# Patient Record
Sex: MALE | Race: WHITE | NOT HISPANIC OR LATINO | Employment: OTHER | URBAN - METROPOLITAN AREA
[De-identification: names, ages, dates, MRNs, and addresses within clinical notes are randomized per-mention and may not be internally consistent; named-entity substitution may affect disease eponyms.]

---

## 2021-04-14 DIAGNOSIS — Z23 ENCOUNTER FOR IMMUNIZATION: ICD-10-CM

## 2022-11-10 ENCOUNTER — EVALUATION (OUTPATIENT)
Dept: PHYSICAL THERAPY | Facility: CLINIC | Age: 67
End: 2022-11-10

## 2022-11-10 DIAGNOSIS — M54.32 SCIATICA OF LEFT SIDE: Primary | ICD-10-CM

## 2022-11-10 NOTE — LETTER
2022    [facility] [address]    Patient: Xin Collado   YOB: 1955   Date of Visit: 11/10/2022     Encounter Diagnosis     ICD-10-CM    1  Sciatica of left side  M54 32        Dear Dr Valentin Cavanaugh: Thank you for your recent referral of Xin Collado  Please review the attached evaluation summary from [facility] recent visit  Please verify that you agree with the plan of care by signing the attached order  If you have any questions or concerns, please do not hesitate to call  I sincerely appreciate the opportunity to share in the care of one of your patients and hope to have another opportunity to work with you in the near future  Sincerely,    Adam Early, PT      Referring Provider:      I certify that I have read the below Plan of Care and certify the need for these services furnished under this plan of treatment while under my care                      La Nena Hamilton DO  [address] [facility] [address]  Via Fax: [phone]          PT Evaluation     Today's date: 11/10/2022  Patient name: Xin Collado  : 1955  MRN: [id number]  Referring provider: Se Vásquez DO  Dx:   Encounter Diagnosis     ICD-10-CM    1  Sciatica of left side  M54 32        Start Time: 2978  Stop Time: 171  Total time in clinic (min): 41 minutes    Assessment  Assessment details: Xin Collado is a 69 y o  male presenting to outpatient physical therapy at [facility] with complaints of Low back and LLE pain   He presents with decreased lumbopelvic range of motion, decreased strength, limited flexibility, poor postural awareness, poor body mechanics, altered gait pattern, poor balance, decreased tolerance to activity and decreased functional mobility due to sciatica   He would benefit from skilled PT services in order to address these deficits and reach maximum level of function   Thank you for the referral!    Impairments: abnormal or restricted ROM, activity intolerance, impaired physical strength and pain with function    Symptom irritability: lowUnderstanding of Dx/Px/POC: good   Prognosis: good    Goals  STG  1  Independent with HEP in 2 weeks  2  Decrease pain at worst by 50% in 2 weeks    LTG  1  Increase lumbopelvic strength in all planes to 5/5 in 5 weeks  2  Return to full, unrestricted sitting, standing and walking in 5 weeks        Plan  Patient would benefit from: skilled physical therapy  Planned modality interventions: thermotherapy: hydrocollator packs  Planned therapy interventions: manual therapy, neuromuscular re-education, therapeutic activities and therapeutic exercise  Frequency: 2x week  Duration in weeks: 4  Treatment plan discussed with: patient        Subjective Evaluation    History of Present Illness  Date of onset: 10/27/2022  Mechanism of injury: Pt reports 2 weeks of L side back and sciatic sxs which have worsened when driving  X rays normal  Pt denies pain with coughing sneezing  No pain at night  Pain is mainly affected by position     Quality of life: good    Pain  Current pain ratin  At best pain ratin  At worst pain ratin  Quality: dull ache and sharp  Relieving factors: ice and medications  Aggravating factors: sitting, standing and walking  Progression: no change      Diagnostic Tests  X-ray: normal  Treatments  Previous treatment: chiropractic, injection treatment and medication  Patient Goals  Patient goals for therapy: increased strength, independence with ADLs/IADLs and decreased pain  Patient goal: Eliminate pain with driving        Objective     Active Range of Motion     Lumbar   Flexion:  with pain Restriction level: minimal  Extension:  with pain Restriction level: maximal  Left lateral flexion:  with pain Restriction level: minimal  Right lateral flexion:  Restriction level: minimal  Left rotation:  WFL  Right rotation:  [facility]    Joint Play     Hypomobile: L1, L2, L3, L4 and L5 Pain: L4 and L5     Strength/Myotome Testing     Lumbar   Left   Heel walk: normal  Toe walk: normal    Right   Heel walk: normal  Toe walk: normal    Left Hip   Planes of Motion   Flexion: 5  Extension: 5  Abduction: 5  Adduction: 5    Right Hip   Planes of Motion   Abduction: 5  Adduction: 5    Left Knee   Flexion: 5  Extension: 5    Right Knee   Flexion: 5  Extension: 5    Left Ankle/Foot   Dorsiflexion: 5  Plantar flexion: 5    Right Ankle/Foot   Dorsiflexion: 5  Plantar flexion: 5    Muscle Activation   Patient able to activate left transverse abdominals and right transverse abdominals  Tests     Lumbar     Left   Positive passive SLR and slump test      Right   Negative passive SLR  Left Pelvic Girdle/Sacrum   Positive: active SLR test      Right Pelvic Girdle/Sacrum   Negative: active SLR test      Left Hip   Negative DOLORES       Right Hip   Negative DOLORES          EPOC: 12/14/22           Manuals 11/10            MITZY ritter Lumbar spine NS                                                   Neuro Re-Ed             Prone glut set 10x5"            Prone hip ext iso 10x5" ea                                                                             Ther Ex             (BRANDAN) Prone on Elbows with heat on LS 8'                                                                                                       Ther Activity                                       Gait Training                                       Modalities

## 2022-11-10 NOTE — PROGRESS NOTES
PT Evaluation     Today's date: 11/10/2022  Patient name: Dori Joyner  : 1955  MRN: [id number]  Referring provider: Lisa Diaz DO  Dx:   Encounter Diagnosis     ICD-10-CM    1  Sciatica of left side  M54 32        Start Time: 163  Stop Time:   Total time in clinic (min): 41 minutes    Assessment  Assessment details: Dori Joyner is a 69 y o  male presenting to outpatient physical therapy at [facility] with complaints of Low back and LLE pain   He presents with decreased lumbopelvic range of motion, decreased strength, limited flexibility, poor postural awareness, poor body mechanics, altered gait pattern, poor balance, decreased tolerance to activity and decreased functional mobility due to sciatica   He would benefit from skilled PT services in order to address these deficits and reach maximum level of function   Thank you for the referral!    Impairments: abnormal or restricted ROM, activity intolerance, impaired physical strength and pain with function    Symptom irritability: lowUnderstanding of Dx/Px/POC: good   Prognosis: good    Goals  STG  1  Independent with HEP in 2 weeks  2  Decrease pain at worst by 50% in 2 weeks    LTG  1  Increase lumbopelvic strength in all planes to 5/5 in 5 weeks  2  Return to full, unrestricted sitting, standing and walking in 5 weeks        Plan  Patient would benefit from: skilled physical therapy  Planned modality interventions: thermotherapy: hydrocollator packs  Planned therapy interventions: manual therapy, neuromuscular re-education, therapeutic activities and therapeutic exercise  Frequency: 2x week  Duration in weeks: 4  Treatment plan discussed with: patient        Subjective Evaluation    History of Present Illness  Date of onset: 10/27/2022  Mechanism of injury: Pt reports 2 weeks of L side back and sciatic sxs which have worsened when driving  X rays normal  Pt denies pain with coughing sneezing  No pain at night   Pain is mainly affected by position  Quality of life: good    Pain  Current pain ratin  At best pain ratin  At worst pain ratin  Quality: dull ache and sharp  Relieving factors: ice and medications  Aggravating factors: sitting, standing and walking  Progression: no change      Diagnostic Tests  X-ray: normal  Treatments  Previous treatment: chiropractic, injection treatment and medication  Patient Goals  Patient goals for therapy: increased strength, independence with ADLs/IADLs and decreased pain  Patient goal: Eliminate pain with driving        Objective     Active Range of Motion     Lumbar   Flexion:  with pain Restriction level: minimal  Extension:  with pain Restriction level: maximal  Left lateral flexion:  with pain Restriction level: minimal  Right lateral flexion:  Restriction level: minimal  Left rotation:  WFL  Right rotation:  [facility]    Joint Play     Hypomobile: L1, L2, L3, L4 and L5     Pain: L4 and L5     Strength/Myotome Testing     Lumbar   Left   Heel walk: normal  Toe walk: normal    Right   Heel walk: normal  Toe walk: normal    Left Hip   Planes of Motion   Flexion: 5  Extension: 5  Abduction: 5  Adduction: 5    Right Hip   Planes of Motion   Abduction: 5  Adduction: 5    Left Knee   Flexion: 5  Extension: 5    Right Knee   Flexion: 5  Extension: 5    Left Ankle/Foot   Dorsiflexion: 5  Plantar flexion: 5    Right Ankle/Foot   Dorsiflexion: 5  Plantar flexion: 5    Muscle Activation   Patient able to activate left transverse abdominals and right transverse abdominals  Tests     Lumbar     Left   Positive passive SLR and slump test      Right   Negative passive SLR  Left Pelvic Girdle/Sacrum   Positive: active SLR test      Right Pelvic Girdle/Sacrum   Negative: active SLR test      Left Hip   Negative DOLORES       Right Hip   Negative DOLORES          EPOC: 22           Manuals 11/10            MITZY ritter Lumbar spine NS                                                   Neuro Re-Ed             Prone glut set 10x5"            Prone hip ext iso 10x5" ea                                                                             Ther Ex             (BRANDAN) Prone on Elbows with heat on LS 8'                                                                                                       Ther Activity                                       Gait Training                                       Modalities

## 2022-11-14 ENCOUNTER — OFFICE VISIT (OUTPATIENT)
Dept: PHYSICAL THERAPY | Facility: CLINIC | Age: 67
End: 2022-11-14

## 2022-11-14 DIAGNOSIS — M54.32 SCIATICA OF LEFT SIDE: Primary | ICD-10-CM

## 2022-11-14 NOTE — PROGRESS NOTES
Daily Note     Today's date: 2022  Patient name: Kamila Salazar  : 1955  MRN: [id number]  Referring provider: Aracelis Adan DO  Dx:   Encounter Diagnosis     ICD-10-CM    1  Sciatica of left side  M54 32                   Subjective: Notes feeling okay  Still getting discomfort with sitting  Objective: See treatment diary below      Assessment: Continues to have L leg length dyscrepancy with noted pelvic upslip  Improved with Gr 5 L leg distraction and reinforced with performance of L SL bridge  Weakness with hip ABD and extension  Plan: Continue per plan of care              Manuals 11/10 11/14           MITZY ritter Lumbar spine NS PF                                                  Neuro Re-Ed             Prone glut set 10x5" 10x5"           Prone hip ext iso 10x5" ea 10x5"                                                                            Ther Ex             (BRANDAN) Prone on Elbows with heat on LS 8' 8'           SL Bridge  2x10           Hip ABD  2x10 ea           Sit to stand  2x10                                                               Ther Activity                                       Gait Training                                       Modalities

## 2022-11-16 ENCOUNTER — OFFICE VISIT (OUTPATIENT)
Dept: PHYSICAL THERAPY | Facility: CLINIC | Age: 67
End: 2022-11-16

## 2022-11-16 DIAGNOSIS — M54.32 SCIATICA OF LEFT SIDE: Primary | ICD-10-CM

## 2022-11-16 NOTE — PROGRESS NOTES
Daily Note     Today's date: 2022  Patient name: Vinita Rubio  : 1955  MRN: [id number]  Referring provider: Johanna Walker DO  Dx:   Encounter Diagnosis     ICD-10-CM    1  Sciatica of left side  M54 32           Subjective: Compliant with HEP, no questions regarding POC, motivated to continue PT      Objective: See treatment diary below  Added sit-stand with no UE and bridge with Tband to HEP, see handout  Assessment: Tolerated treatment well  with progression of treatment program as noted below requiring verbal and tactile cues from PT for safe execution of therapeutic exercise, specifically with sidelying hip ABD to avoid flexion compensation    Patient demonstrated fatigue post treatment and would benefit from continued PT      Plan: Progress treatment as tolerated  Assess HEP technique & effects of implementation             Manuals 11/10 11/14 11/16          PA glides Lumbar spine NS PF NS                                                 Neuro Re-Ed             Prone glut set 10x5" 10x5" 20x5"          Prone hip ext iso 10x5" ea 10x5" 20x5"          Sciatic nerve glide             Prone Press             Kit Stretch                                       Ther Ex             (BRANDAN) Prone on Elbows with heat on LS 8' 8' 5' w/prone glut sets          SL Bridge  2x10 3x10 Red Tband around knees          Hip ABD  2x10 ea 3x10          Sit to stand  2x10 3x10 7 5lb KB          Seated HS stretch   10x10" ea                                                 Ther Activity                                       Gait Training                                       Modalities

## 2022-11-21 ENCOUNTER — OFFICE VISIT (OUTPATIENT)
Dept: PHYSICAL THERAPY | Facility: CLINIC | Age: 67
End: 2022-11-21

## 2022-11-21 DIAGNOSIS — M54.32 SCIATICA OF LEFT SIDE: Primary | ICD-10-CM

## 2022-11-21 NOTE — PROGRESS NOTES
Daily Note     Today's date: 2022  Patient name: Sunny Celestin  : 1955  MRN: [id number]  Referring provider: Dakotah Weiss DO  Dx:   Encounter Diagnosis     ICD-10-CM    1  Sciatica of left side  M54 32           Subjective: Compliant with HEP, no questions regarding POC, motivated to continue PT      Objective: See treatment diary below  Added seated hamstring stretches to HEP, see handout  Assessment: Tolerated treatment well  with progression of treatment program as noted below requiring verbal and tactile cues from PT for safe execution of therapeutic exercise  Patient demonstrated fatigue post treatment and would benefit from continued PT      Plan: Progress treatment as tolerated  Assess HEP technique & effects of implementation              Manuals           PA glides Lumbar spine NS          G5 Leg pull                                 Neuro Re-Ed           Prone glut set 20x5"          Prone hip ext iso 20x5"          Sciatic nerve glide 10x10" ea                                                      Ther Ex           (BRANDAN) Prone on Elbows with heat on LS 5' w/prone glut sets          SL Bridge 2x10 ea          Hip ABD 2x10 3" supine clam Purple Tb          Sit to stand 2x10 10lb KB          Seated HS stretch 10x10" ea          Crossed trunk rotation 10x10 ea          Seated ball rollout x10 5" ea x3 directions                     Ther Activity                                 Gait Training                                 Modalities

## 2022-11-23 ENCOUNTER — OFFICE VISIT (OUTPATIENT)
Dept: PHYSICAL THERAPY | Facility: CLINIC | Age: 67
End: 2022-11-23

## 2022-11-23 DIAGNOSIS — M54.32 SCIATICA OF LEFT SIDE: Primary | ICD-10-CM

## 2022-11-23 NOTE — PROGRESS NOTES
Daily Note     Today's date: 2022  Patient name: Darlin Saint  : 1955  MRN: [id number]  Referring provider: Jeanna Lino DO  Dx:   Encounter Diagnosis     ICD-10-CM    1  Sciatica of left side  M54 32                      Subjective: Notes feeling the same pain at L HS with sitting for prolonged periods  Slight reduced sxs with towel roll placement with driving  Objective: See treatment diary below      Assessment: TTP to L biceps femoris improved with TrP massage and performance of bridge walkouts and sciatic nerve glides  Still pain with L gluteal deep pressure improved post STM and MFR  Reviewed sitting posture in car- moved seated from reclined position to more 90/90/90 position and towel roll placement  Plan: Continue per plan of care              Manuals          PA glides Lumbar spine NS PF         G5 Leg pull  PF                               Neuro Re-Ed           Prone glut set 20x5" 20x5         Prone hip ext iso 20x5" 20x5         Sciatic nerve glide 10x10" ea 10x2                                                     Ther Ex           (BRANDAN) Prone on Elbows with heat on LS 5' w/prone glut sets 5 min          SL Bridge 2x10 ea NV         Hip ABD 2x10 3" supine clam Purple Tb NV         Sit to stand 2x10 10lb KB          Seated HS stretch 10x10" ea 10x3"         Crossed trunk rotation 10x10 ea          Seated ball rollout x10 5" ea x3 directions          Bridge walkouts  20x         Ther Activity                                 Gait Training                                 Modalities

## 2022-11-28 ENCOUNTER — OFFICE VISIT (OUTPATIENT)
Dept: PHYSICAL THERAPY | Facility: CLINIC | Age: 67
End: 2022-11-28

## 2022-11-28 DIAGNOSIS — M54.32 SCIATICA OF LEFT SIDE: Primary | ICD-10-CM

## 2022-11-28 NOTE — PROGRESS NOTES
Daily Note     Today's date: 2022  Patient name: Dania Norman  : 1955  MRN: [id number]  Referring provider: Olga Gee DO  Dx:   Encounter Diagnosis     ICD-10-CM    1  Sciatica of left side  M54 32             Subjective: significant flare up of LLE sxs, worse after last session  Unable to go hunting or transfer from car      Objective: See treatment diary below      Assessment: Tolerated treatment well reporting improved sxs from baseline following MT addition of ART to HS mm  Pt educated in seated HS stretching with hip ER to isolate mm tightness in LLE  Patient would benefit from continued PT      Plan: Progress treatment as tolerated  Perform RE NV  Manuals         PA glides Lumbar spine NS PF NS        G5 Leg pull  PF         L HS ART    NS        LS HS stretch   NS        Neuro Re-Ed           Prone glut set 20x5" 20x5 20x5        Prone hip ext iso 20x5" 20x5         Sciatic nerve glide 10x10" ea 10x2                                                     Ther Ex           (BRANDAN) Prone on Elbows with heat on LS 5' w/prone glut sets 5 min  5'        SL Bridge 2x10 ea NV         Slant board   10x10"        Seated HS stretch with L Hip ER   10x10"        Hip ABD 2x10 3" supine clam Purple Tb NV         Sit to stand 2x10 10lb KB          Seated HS stretch 10x10" ea 10x3"         Crossed trunk rotation 10x10 ea          Seated ball rollout x10 5" ea x3 directions          Bridge walkouts  20x         Ther Activity           TM incline   5 min L8 incline 1  7mph                   Gait Training                                 Modalities

## 2022-11-30 ENCOUNTER — EVALUATION (OUTPATIENT)
Dept: PHYSICAL THERAPY | Facility: CLINIC | Age: 67
End: 2022-11-30

## 2022-11-30 DIAGNOSIS — M54.32 SCIATICA OF LEFT SIDE: Primary | ICD-10-CM

## 2022-11-30 NOTE — PROGRESS NOTES
PT Re-Evaluation     Today's date: 2022  Patient name: Xin Collado  : 1955  MRN: [id number]  Referring provider: Se Vásquez DO  Dx:   Encounter Diagnosis     ICD-10-CM    1  Sciatica of left side  M54 32        Assessment  Assessment details: Xin Collado is a 69 y o  male seen in outpatient physical therapy at [facility] with complaints of Low back and LLE pain   He has fredy treated for decreased lumbopelvic range of motion, decreased strength, limited flexibility, poor postural awareness, poor body mechanics, altered gait pattern, poor balance, decreased tolerance to activity and decreased functional mobility due to sciatica   Re-evaluation was performed today to assess if he would benefit from skilled PT services in order to address these deficits and reach maximum level of function   Thank you for the referral!    Impairments: abnormal or restricted ROM, activity intolerance, impaired physical strength and pain with function    Symptom irritability: lowUnderstanding of Dx/Px/POC: good   Prognosis: good    Goals  STG  1  Independent with HEP in 2 weeks       Goal met  2  Decrease pain at worst by 50% in 2 weeks     No progress    LTG  1  Increase lumbopelvic strength in all planes to 5/5 in 5 weeks   Goal met  2  Return to full, unrestricted sitting, standing and walking in 5 weeks  Limited sitting for driving, somewhat with standing/walking        Plan  Patient has a f/u with physician next Monday to discuss next step / further treatment options to provide symptoms relief         Subjective Evaluation    History of Present Illness  Date of onset: 10/27/2022  Mechanism of injury: Pt reports 2 weeks of L side back and sciatic sxs which have worsened when driving  X rays normal  Pt denies pain with coughing sneezing  No pain at night  Pain is mainly affected by position      Some pain at night reported now  Quality of life: good    Pain  Current pain ratin  At best pain ratin  At worst pain ratin       9/10  Quality: dull ache and sharp  Relieving factors: ice and medications  Aggravating factors: sitting, standing and walking  Progression: no change      Diagnostic Tests  X-ray: normal  Treatments  Previous treatment: chiropractic, medication  Patient Goals  Patient goals for therapy: increased strength, independence with ADLs/IADLs and decreased pain  Patient goal: Eliminate pain with driving    No change        Objective     Active Range of Motion     Lumbar   Flexion:  with pain Restriction level: minimal     No pain, LLE tightness reported  Extension:  with pain Restriction level: maximal    Pain R side with minimal restriction   Left lateral flexion:  with pain Restriction level: minimal   R side pain with no restriction   Right lateral flexion:  Restriction level: minimal    R side pain with no restriction  Left rotation:  WFL  Right rotation:  [facility]    Joint Play     Hypomobile: L1, L2, L3, L4 and L5     Pain: L4 and L5     Strength/Myotome Testing     Lumbar   Left   Heel walk: normal  Toe walk: normal    Right   Heel walk: normal  Toe walk: normal    Left Hip   Planes of Motion   Flexion: 5  Extension: 5  Abduction: 5  Adduction: 5    Right Hip   Planes of Motion   Abduction: 5  Adduction: 5    Left Knee   Flexion: 5  Extension: 5    Right Knee   Flexion: 5  Extension: 5    Left Ankle/Foot   Dorsiflexion: 5  Plantar flexion: 5    Right Ankle/Foot   Dorsiflexion: 5  Plantar flexion: 5    Muscle Activation   Patient able to activate left transverse abdominals and right transverse abdominals  Tests     Lumbar     Left   Positive passive SLR and slump test      negative    Right   Negative passive SLR  Left Pelvic Girdle/Sacrum   Positive: active SLR test        negative    Right Pelvic Girdle/Sacrum   Negative: active SLR test      Left Hip   Negative DOLORES       Right Hip   Negative DOLORES          EPOC: 22         Manuals        PA riya Lumbar spine NS PF NS        G5 Leg pull  PF         L HS ART    NS        LS HS stretch   NS NS       L piriformis stretch    NS       Neuro Re-Ed           Prone glut set 20x5" 20x5 20x5        Prone hip ext iso 20x5" 20x5  20x5"       Sciatic nerve glide 10x10" ea 10x2                                                     Ther Ex           (BRANDAN) Prone on Elbows with heat on LS 5' w/prone glut sets 5 min  5'        SL Bridge 2x10 ea NV         Slant board   10x10"        Seated HS stretch with L Hip ER   10x10" 10x10"       Hip ABD 2x10 3" supine clam Purple Tb NV         Sit to stand 2x10 10lb KB          Seated HS stretch 10x10" ea 10x3"         Crossed trunk rotation 10x10 ea          Seated ball rollout x10 5" ea x3 directions          Bridge walkouts  20x         Ther Activity           TM incline   5 min L8 incline 1  7mph                   Gait Training                                 Modalities

## 2022-11-30 NOTE — LETTER
2022    [facility] [address]    Patient: Landon Akbar   YOB: 1955   Date of Visit: 2022     Encounter Diagnosis     ICD-10-CM    1  Sciatica of left side  M54 32           Dear Dr Marisa Najera: Thank you for your recent referral of Landon Akbar  Please review the attached evaluation summary from [facility] recent visit  Please verify that you agree with the plan of care by signing the attached order  If you have any questions or concerns, please do not hesitate to call  I sincerely appreciate the opportunity to share in the care of one of your patients and hope to have another opportunity to work with you in the near future  Sincerely,    Demetrius Posadas, PT      Referring Provider:      I certify that I have read the below Plan of Care and certify the need for these services furnished under this plan of treatment while under my care                      Tushar Salcedo DO  [address] [facility] [address]  Via Fax: [phone]          PT Re-Evaluation     Today's date: 2022  Patient name: Landon Akbar  : 1955  MRN: [id number]  Referring provider: Huong Culver DO  Dx:   Encounter Diagnosis     ICD-10-CM    1  Sciatica of left side  M54 32        Assessment  Assessment details: Landon Akbar is a 69 y o  male seen in outpatient physical therapy at [facility] with complaints of Low back and LLE pain   He has fredy treated for decreased lumbopelvic range of motion, decreased strength, limited flexibility, poor postural awareness, poor body mechanics, altered gait pattern, poor balance, decreased tolerance to activity and decreased functional mobility due to sciatica   Re-evaluation was performed today to assess if he would benefit from skilled PT services in order to address these deficits and reach maximum level of function   Thank you for the referral!    Impairments: abnormal or restricted ROM, activity intolerance, impaired physical strength and pain with function    Symptom irritability: lowUnderstanding of Dx/Px/POC: good   Prognosis: good    Goals  STG  1  Independent with HEP in 2 weeks       Goal met  2  Decrease pain at worst by 50% in 2 weeks     No progress    LTG  1  Increase lumbopelvic strength in all planes to 5/5 in 5 weeks   Goal met  2  Return to full, unrestricted sitting, standing and walking in 5 weeks  Limited sitting for driving, somewhat with standing/walking        Plan  Patient has a f/u with physician next Monday to discuss next step / further treatment options to provide symptoms relief         Subjective Evaluation    History of Present Illness  Date of onset: 10/27/2022  Mechanism of injury: Pt reports 2 weeks of L side back and sciatic sxs which have worsened when driving  X rays normal  Pt denies pain with coughing sneezing  No pain at night  Pain is mainly affected by position      Some pain at night reported now  Quality of life: good    Pain  Current pain ratin  At best pain ratin  At worst pain ratin       9/10  Quality: dull ache and sharp  Relieving factors: ice and medications  Aggravating factors: sitting, standing and walking  Progression: no change      Diagnostic Tests  X-ray: normal  Treatments  Previous treatment: chiropractic, medication  Patient Goals  Patient goals for therapy: increased strength, independence with ADLs/IADLs and decreased pain  Patient goal: Eliminate pain with driving    No change        Objective     Active Range of Motion     Lumbar   Flexion:  with pain Restriction level: minimal     No pain, LLE tightness reported  Extension:  with pain Restriction level: maximal    Pain R side with minimal restriction   Left lateral flexion:  with pain Restriction level: minimal   R side pain with no restriction   Right lateral flexion:  Restriction level: minimal    R side pain with no restriction  Left rotation:  WFL  [facility]    Joint Play     Hypomobile: L1, L2, L3, L4 and L5     Pain: L4 and L5     Strength/Myotome Testing     Lumbar   Left   Heel walk: normal  Toe walk: normal    Right   Heel walk: normal  Toe walk: normal    Left Hip   Planes of Motion   Flexion: 5  Extension: 5  Abduction: 5  Adduction: 5    Right Hip   Planes of Motion   Abduction: 5  Adduction: 5    Left Knee   Flexion: 5  Extension: 5    Right Knee   Flexion: 5  Extension: 5    Left Ankle/Foot   Dorsiflexion: 5  Plantar flexion: 5    Right Ankle/Foot   Dorsiflexion: 5  Plantar flexion: 5    Muscle Activation   Patient able to activate left transverse abdominals and right transverse abdominals  Tests     Lumbar     Left   Positive passive SLR and slump test      negative    Right   Negative passive SLR  Left Pelvic Girdle/Sacrum   Positive: active SLR test        negative    Right Pelvic Girdle/Sacrum   Negative: active SLR test      Left Hip   Negative DOLORES  Right Hip   Negative DOLORES          EPOC: 12/14/22         Manuals 11/21 11/23 11/28 11/30       PA glides Lumbar spine NS PF NS        G5 Leg pull  PF         L HS ART    NS        LS HS stretch   NS NS       L piriformis stretch    NS       Neuro Re-Ed           Prone glut set 20x5" 20x5 20x5        Prone hip ext iso 20x5" 20x5  20x5"       Sciatic nerve glide 10x10" ea 10x2                                                     Ther Ex           (BRANDAN) Prone on Elbows with heat on LS 5' w/prone glut sets 5 min  5'        SL Bridge 2x10 ea NV         Slant board   10x10"        Seated HS stretch with L Hip ER   10x10" 10x10"       Hip ABD 2x10 3" supine clam Purple Tb NV         Sit to stand 2x10 10lb KB          Seated HS stretch 10x10" ea 10x3"         Crossed trunk rotation 10x10 ea          Seated ball rollout x10 5" ea x3 directions          Bridge walkouts  20x         Ther Activity           TM incline   5 min L8 incline 1  7mph                   Gait Training Modalities

## 2025-01-27 ENCOUNTER — TELEPHONE (OUTPATIENT)
Age: 70
End: 2025-01-27

## 2025-01-27 NOTE — TELEPHONE ENCOUNTER
New Patient    What is the reason for the patient’s appointment?: nocturia     What office location does the patient prefer?: [place] with [facility] due to recommendation from nurse from recent knee surgery pt had     Does patient have Imaging/Lab Results: US and labs from [facility] pt recently had knee surgery     Have patient records been requested?: records in epic   If No, are the records showing in Epic:

## 2025-03-06 ENCOUNTER — OFFICE VISIT (OUTPATIENT)
Dept: UROLOGY | Facility: MEDICAL CENTER | Age: 70
End: 2025-03-06
Payer: MEDICARE

## 2025-03-06 VITALS
DIASTOLIC BLOOD PRESSURE: 74 MMHG | BODY MASS INDEX: 28.35 KG/M2 | OXYGEN SATURATION: 97 % | HEART RATE: 80 BPM | HEIGHT: 70 IN | SYSTOLIC BLOOD PRESSURE: 114 MMHG | WEIGHT: 198 LBS | RESPIRATION RATE: 16 BRPM

## 2025-03-06 DIAGNOSIS — N13.8 BPH WITH URINARY OBSTRUCTION: Primary | ICD-10-CM

## 2025-03-06 DIAGNOSIS — R35.1 NOCTURIA: ICD-10-CM

## 2025-03-06 DIAGNOSIS — N40.1 BPH WITH URINARY OBSTRUCTION: Primary | ICD-10-CM

## 2025-03-06 PROCEDURE — 99203 OFFICE O/P NEW LOW 30 MIN: CPT | Performed by: UROLOGY

## 2025-03-06 RX ORDER — AMOXICILLIN 500 MG/1
TABLET, FILM COATED ORAL
COMMUNITY
Start: 2025-02-13

## 2025-03-06 RX ORDER — AMLODIPINE AND BENAZEPRIL HYDROCHLORIDE 10; 40 MG/1; MG/1
1 CAPSULE ORAL EVERY MORNING
COMMUNITY
Start: 2024-11-11

## 2025-03-06 RX ORDER — OXYBUTYNIN CHLORIDE 5 MG/1
TABLET ORAL
COMMUNITY

## 2025-03-06 RX ORDER — EZETIMIBE 10 MG/1
10 TABLET ORAL DAILY
COMMUNITY
Start: 2024-10-08

## 2025-03-06 RX ORDER — MELOXICAM 15 MG/1
TABLET ORAL AS NEEDED
COMMUNITY

## 2025-03-06 RX ORDER — FINASTERIDE 5 MG/1
TABLET, FILM COATED ORAL EVERY 24 HOURS
COMMUNITY

## 2025-03-06 RX ORDER — HYDROCODONE BITARTRATE AND ACETAMINOPHEN 5; 325 MG/1; MG/1
1-2 TABLET ORAL EVERY 6 HOURS PRN
COMMUNITY
Start: 2025-02-13

## 2025-03-06 RX ORDER — ATORVASTATIN CALCIUM 40 MG/1
40 TABLET, FILM COATED ORAL
COMMUNITY

## 2025-03-06 RX ORDER — TAMSULOSIN HYDROCHLORIDE 0.4 MG/1
1 CAPSULE ORAL 2 TIMES DAILY
COMMUNITY
Start: 2025-01-09

## 2025-03-06 RX ORDER — HYDROXYZINE HYDROCHLORIDE 25 MG/1
25 TABLET, FILM COATED ORAL EVERY 6 HOURS PRN
COMMUNITY
Start: 2024-12-24

## 2025-03-06 NOTE — PATIENT INSTRUCTIONS
1.  Consider stopping finasteride.  He would know after 2 or 3 months if your symptoms are any more bothersome.  2.  Finasteride cuts the PSA in half, so you might notice the next PSA has gone up a little bit was it did go down    3.  Consider cutting back on oxybutynin.  If you find yourself getting up more at night, then he could restart it either at 1 pill per night, or 2

## 2025-03-06 NOTE — PROGRESS NOTES
HISTORY:    1.  BPH    When he first saw urology at [facility] in 2021, he complained of nocturia x 3-5, something decreased daytime frequency.  He was prescribed tamsulosin twice per day, finasteride, and oxybutynin 10 mg before bed.  He has been on those meds ever since.    Patient says he was told at that visit that he did not need to come back for urology follow-up.    Patient feels he should have urology follow-up and is here for ongoing care.    He has a good stream and control.  Nocturia x 1-2.    Just had a knee replacement and he said they did a straight cath for poor bladder emptying before discharge.  He had slight dysuria and a sense of passing something, maybe a stone, soon after that straight cath.  No burning urgency frequency now.           ASSESSMENT / PLAN:    1.  Prostate feels normal, and low PSA, even for finasteride correction.    2.  We discussed the 3 medicines he is on.    His prostate does not really feel enlarged, I do not really think he needs  finasteride.    Regarding the oxybutynin, his option if he wants to cut back on that and see if his nighttime frequency gets any worse.    3.  He gets PSA through PCP, he knows that stopping the finasteride may double the PSA.    4.  Follow-up 1 year        Review of Systems      Objective:     Physical Exam  Genitourinary:     Comments: Penis testes normal    Prostate minimally enlarged no nodules            PSA 1.1 in June 2024, [facility]      There is no problem list on file for this patient.       Patient ID: Julien Stewart is a 69 y.o. male.      Current Outpatient Medications:     amLODIPine-benazepril (LOTREL) 10-40 MG per capsule, Take 1 capsule by mouth every morning, Disp: , Rfl:     amoxicillin (AMOXIL) 500 MG tablet, TAKE 4 TABLETS BY MOUTH 1 HOUR BEFORE DENTIST APPOINTMENT, Disp: , Rfl:     atorvastatin (LIPITOR) 40 mg tablet, Take 40 mg by mouth, Disp: , Rfl:     ezetimibe (ZETIA) 10 mg tablet, Take 10 mg by mouth daily, Disp: , Rfl:      finasteride (PROSCAR) 5 mg tablet, every 24 hours, Disp: , Rfl:     HYDROcodone-acetaminophen (NORCO) 5-325 mg per tablet, Take 1-2 tablets by mouth every 6 (six) hours as needed, Disp: , Rfl:     hydrOXYzine HCL (ATARAX) 25 mg tablet, Take 25 mg by mouth every 6 (six) hours as needed, Disp: , Rfl:     meloxicam (MOBIC) 15 mg tablet, as needed, Disp: , Rfl:     oxybutynin (DITROPAN) 5 mg tablet, take two tablets by mouth every evening, Disp: , Rfl:     tamsulosin (FLOMAX) 0.4 mg, Take 1 capsule by mouth 2 (two) times a day, Disp: , Rfl:

## 2025-07-31 ENCOUNTER — HOSPITAL ENCOUNTER (OUTPATIENT)
Age: 70
Discharge: HOME | End: 2025-07-31
Payer: MEDICARE

## 2025-07-31 DIAGNOSIS — R10.31 RT GROIN PAIN: ICD-10-CM

## 2025-07-31 PROCEDURE — 73502 X-RAY EXAM HIP UNI 2-3 VIEWS: CPT

## 2025-08-11 ENCOUNTER — HOSPITAL ENCOUNTER (OUTPATIENT)
Age: 70
Discharge: HOME | End: 2025-08-11
Attending: STUDENT IN AN ORGANIZED HEALTH CARE EDUCATION/TRAINING PROGRAM
Payer: MEDICARE